# Patient Record
Sex: FEMALE | Race: WHITE | HISPANIC OR LATINO | Employment: UNEMPLOYED | URBAN - METROPOLITAN AREA
[De-identification: names, ages, dates, MRNs, and addresses within clinical notes are randomized per-mention and may not be internally consistent; named-entity substitution may affect disease eponyms.]

---

## 2023-09-26 ENCOUNTER — PATIENT MESSAGE (OUTPATIENT)
Dept: FAMILY MEDICINE | Facility: CLINIC | Age: 23
End: 2023-09-26

## 2023-09-26 ENCOUNTER — OFFICE VISIT (OUTPATIENT)
Dept: FAMILY MEDICINE | Facility: CLINIC | Age: 23
End: 2023-09-26
Payer: OTHER GOVERNMENT

## 2023-09-26 VITALS
RESPIRATION RATE: 16 BRPM | WEIGHT: 154 LBS | DIASTOLIC BLOOD PRESSURE: 80 MMHG | HEIGHT: 64 IN | BODY MASS INDEX: 26.29 KG/M2 | SYSTOLIC BLOOD PRESSURE: 108 MMHG

## 2023-09-26 DIAGNOSIS — Z31.69 PRE-CONCEPTION COUNSELING: ICD-10-CM

## 2023-09-26 DIAGNOSIS — F33.41 RECURRENT MAJOR DEPRESSIVE DISORDER, IN PARTIAL REMISSION: ICD-10-CM

## 2023-09-26 DIAGNOSIS — F17.200 VAPING NICOTINE DEPENDENCE, NON-TOBACCO PRODUCT: ICD-10-CM

## 2023-09-26 DIAGNOSIS — F90.9 ATTENTION DEFICIT HYPERACTIVITY DISORDER (ADHD), UNSPECIFIED ADHD TYPE: Primary | ICD-10-CM

## 2023-09-26 PROCEDURE — 99204 OFFICE O/P NEW MOD 45 MIN: CPT | Mod: S$GLB,,, | Performed by: STUDENT IN AN ORGANIZED HEALTH CARE EDUCATION/TRAINING PROGRAM

## 2023-09-26 PROCEDURE — 99204 PR OFFICE/OUTPT VISIT, NEW, LEVL IV, 45-59 MIN: ICD-10-PCS | Mod: S$GLB,,, | Performed by: STUDENT IN AN ORGANIZED HEALTH CARE EDUCATION/TRAINING PROGRAM

## 2023-09-26 RX ORDER — LISDEXAMFETAMINE DIMESYLATE 50 MG/1
50 CAPSULE ORAL EVERY MORNING
Qty: 30 CAPSULE | Refills: 0 | Status: SHIPPED | OUTPATIENT
Start: 2023-09-26 | End: 2023-10-27

## 2023-09-26 RX ORDER — LISDEXAMFETAMINE DIMESYLATE 50 MG/1
50 CAPSULE ORAL EVERY MORNING
COMMUNITY
End: 2023-09-26

## 2023-09-26 RX ORDER — BUPROPION HYDROCHLORIDE 150 MG/1
150 TABLET ORAL DAILY
Qty: 30 TABLET | Refills: 2 | Status: SHIPPED | OUTPATIENT
Start: 2023-09-26 | End: 2023-10-05

## 2023-09-26 RX ORDER — BUPROPION HYDROCHLORIDE 150 MG/1
150 TABLET ORAL DAILY
COMMUNITY
End: 2023-09-26

## 2023-09-26 RX ORDER — DM/P-EPHED/ACETAMINOPH/DOXYLAM 30-7.5/3
2 LIQUID (ML) ORAL
Qty: 90 LOZENGE | Refills: 1 | Status: SHIPPED | OUTPATIENT
Start: 2023-09-26 | End: 2023-10-05

## 2023-09-26 NOTE — PROGRESS NOTES
[facility]  Primary Care Clinic - [place], [place]    Family Medicine Office Visit    Subjective     Patient ID: Luiza Asif is a 23 y.o. female who presents to clinic today to establish care.     Medical history, surgical history, medications, allergies, and social history were reviewed and updated.     Chief Complaint: Establish Care (Patient states She'd like to establish care and be referred to a Psychiatrist. She is  insurance and will need a [facility] auth.)    HPI    Establish care  She presents today to establish care. She has just moved to the area from [place] with her  who is in the [employer]. We have reviewed medical history, surgical history, family history, medications, allergies, and social history. EMR was updated appropriately.     ADHD/Depression/Anxiety/Binge Eating  Letter for psychiatrist for ADHD/Anxiety/Depression/Binge eating will be uploaded through [facility]. From [place]; [facility]. Has been on Vyvanse 50 mg daily since March 2023. Working well. Taking it for ADHD/Binge eating. 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 No difficulty with appetite or weight loss. No difficulty with sleep.     For the wellbutrin, was missing a few doses. Did report some mild anxiety with taking it, but feels that she has not given herself long enough on it to know if it is working well. No suicidal/homicidal ideation. Has recently moved across the country and reports stress from this.     Vaping  She has a history of vaping for the last 5 years. She is interested in quitting. Has tried drinking from straws and chewing nicotine gum. She also reported that she may try using a vape pen without nicotine in the near future. We discussed that Wellbutrin can be helpful with cravings and would recommend she continue to use it to see if this will help her quit. Agreeable to try nicotine lozenges today.     Pre-conception counseling    She is sexually active with her . They use condoms sometimes. No other birth control in use. She reports not wanting to become pregnant for at least 3 years. Discussed birth control options, including pills, patches, rings, nexplanon, and IUD. She will consider her options. Discussed taking a prenatal vitamin as she is a female of child bearing age to prevent neural tube defects. Also discussed taking plan B if needed in the future. She did have an OBGYN in [place] and had pap smear completed prior to her move. Will obtain records.     Vitals:    09/26/23 1121   BP: 108/80   Resp: 16      Wt Readings from Last 3 Encounters:   09/26/23 1121 69.9 kg (154 lb)      Review of Systems    Review of Systems otherwise negative unless specified above.        Objective     Physical Exam  Constitutional:       General: She is not in acute distress.     Appearance: Normal appearance.   HENT:      Head: Normocephalic and atraumatic.      Mouth/Throat:      Mouth: Mucous membranes are moist.   Cardiovascular:      Rate and Rhythm: Normal rate and regular rhythm.      Heart sounds: No murmur heard.  Pulmonary:      Effort: Pulmonary effort is normal. No respiratory distress.      Breath sounds: Normal breath sounds. No wheezing.   Musculoskeletal:         General: Normal range of motion.   Skin:     General: Skin is warm and dry.   Neurological:      General: No focal deficit present.      Mental Status: She is alert and oriented to person, place, and time.            Assessment and Plan     Current Outpatient Medications   Medication Instructions    buPROPion (WELLBUTRIN XL) 150 mg, Oral, Daily    lisdexamfetamine (VYVANSE) 50 mg, Oral, Every morning    nicotine polacrilex 2 mg, Oral, As needed (PRN)        1. Attention deficit hyperactivity disorder (ADHD), unspecified ADHD type  -     lisdexamfetamine (VYVANSE) 50 MG capsule; Take 1 capsule (50 mg total) by mouth every morning.  Dispense: 30 capsule; Refill: 0    2.  Recurrent major depressive disorder, in partial remission  -     buPROPion (WELLBUTRIN XL) 150 MG TB24 tablet; Take 1 tablet (150 mg total) by mouth once daily.  Dispense: 30 tablet; Refill: 2    3. Vaping nicotine dependence, non-tobacco product  -     nicotine polacrilex 2 MG Lozg; Take 1 lozenge (2 mg total) by mouth as needed (When wanting to vape).  Dispense: 90 lozenge; Refill: 1    4. Pre-conception counseling  -     Cancel: External Referral to Robert Applebaum MD; Future; Expected date: 09/26/2023  -     External Referral to Robert Applebaum MD; Future; Expected date: 09/26/2023        Will continue current medication regimen with Vyvanse and Wellbutrin as above. Will obtain records from PCP, OBGYN, and Psych in [place]. Will also place referral to OBGYN per her request. She does have  through the [facility] and we will get prior authorization if needed. Will otherwise plan to have her return in 1 month once records are received.          Follow up in about 4 weeks (around 10/24/2023) for Follow up with Charles.    Questions were invited and answered. No other acute concerns at this time. Will plan to follow up as above or sooner if needed.     Fadia Soto, DO  09/26/2023 11:44 AM

## 2023-09-26 NOTE — PATIENT INSTRUCTIONS
Benjamin Jarrett,     If you are due for any health screening(s) below please notify me so we can arrange them to be ordered and scheduled. Most healthy patients at your age complete them, but you are free to accept or refuse.     If you can't do it, I'll definitely understand. If you can, I'd certainly appreciate it!    Tests to Keep You Healthy    Cervical Cancer Screening: DUE      Your cervical cancer screening is due     Our records indicate that you may be overdue for your screening Pap smear. A Pap smear is an important health screening that can detect abnormal cells that can become cervical cancer. Cervical cancer screenings allow for early diagnosis and increase the likelihood of successful treatment.     The current recommendation for Pap smear screening is every 3-5 years for women at average risk. We encourage you to schedule your appointment with your womens health provider. Many women see a gynecologist for this screening, but some primary care providers also provide Pap screening.     If you recently had your Pap smear screening performed outside of [facility], please let your health care team know so that they can update your health record.      Were here to help you quit smoking     Our records indicated that you are still smoking. One of the best things you can do for your health is to stop smoking and we are here to help.     Talk with your provider about our Smoking Cessation Program and how we can support you on your journey.

## 2023-09-28 ENCOUNTER — TELEPHONE (OUTPATIENT)
Dept: FAMILY MEDICINE | Facility: CLINIC | Age: 23
End: 2023-09-28
Payer: OTHER GOVERNMENT

## 2023-09-28 NOTE — TELEPHONE ENCOUNTER
Ciaran Augustin,   Please review this message below.  Call placed to pt due to msg left, spoke w/pt calling for an status on PA for Vyvanse medication. Informed pt I sent her earlier msg over to be reviewed by Charli Chavira MA and will send this update request right now.  Thank you.  Signed:  Mariah Pelayo LPN    ----- Message from Nancie Osuna sent at 9/28/2023  2:54 PM CDT -----  Regarding: Patient advice  Contact: Pt  Pt called in regards to getting medication sent to pharmacy     Pt can be reached at [phone]

## 2023-09-28 NOTE — TELEPHONE ENCOUNTER
Ciaran Augustin,   Please review message below from patient states needs a PA for the Rx Vynanse 50 mg.  Thank you.  Signed:  Mariah Pelayo LPN    ----- Message from Miles Hollins sent at 9/28/2023  8:18 AM CDT -----  Type:  Patient Returning Call    Who Called:pt  Who Left Message for Patient:  Does the patient know what this is regarding?:rx   Would the patient rather a call back or a response via [facility]? Call  Best Call Back Number:[phone]  Additional Information: pt would like to kow if the pre authorization were sent into [employer] for the prescription lisdexamfetamine (VYVANSE) 50 MG capsule. Because the pt is having troubles picking up from the pharmacy

## 2023-09-29 ENCOUNTER — PATIENT MESSAGE (OUTPATIENT)
Dept: FAMILY MEDICINE | Facility: CLINIC | Age: 23
End: 2023-09-29

## 2023-09-29 ENCOUNTER — OFFICE VISIT (OUTPATIENT)
Dept: FAMILY MEDICINE | Facility: CLINIC | Age: 23
End: 2023-09-29
Payer: OTHER GOVERNMENT

## 2023-09-29 VITALS
WEIGHT: 162 LBS | BODY MASS INDEX: 27.66 KG/M2 | TEMPERATURE: 98 F | DIASTOLIC BLOOD PRESSURE: 72 MMHG | OXYGEN SATURATION: 98 % | HEIGHT: 64 IN | SYSTOLIC BLOOD PRESSURE: 115 MMHG | HEART RATE: 74 BPM

## 2023-09-29 DIAGNOSIS — B37.31 YEAST VAGINITIS: ICD-10-CM

## 2023-09-29 DIAGNOSIS — N63.23 MASS OF LOWER OUTER QUADRANT OF LEFT BREAST: Primary | ICD-10-CM

## 2023-09-29 DIAGNOSIS — L03.818 CELLULITIS OF OTHER SPECIFIED SITE: ICD-10-CM

## 2023-09-29 PROCEDURE — 99213 OFFICE O/P EST LOW 20 MIN: CPT | Mod: S$GLB,,, | Performed by: STUDENT IN AN ORGANIZED HEALTH CARE EDUCATION/TRAINING PROGRAM

## 2023-09-29 PROCEDURE — 99213 PR OFFICE/OUTPT VISIT, EST, LEVL III, 20-29 MIN: ICD-10-PCS | Mod: S$GLB,,, | Performed by: STUDENT IN AN ORGANIZED HEALTH CARE EDUCATION/TRAINING PROGRAM

## 2023-09-29 RX ORDER — FLUCONAZOLE 150 MG/1
150 TABLET ORAL DAILY
Qty: 2 TABLET | Refills: 0 | Status: SHIPPED | OUTPATIENT
Start: 2023-09-29 | End: 2023-09-30

## 2023-09-29 RX ORDER — SULFAMETHOXAZOLE AND TRIMETHOPRIM 800; 160 MG/1; MG/1
1 TABLET ORAL 2 TIMES DAILY
Qty: 14 TABLET | Refills: 0 | Status: SHIPPED | OUTPATIENT
Start: 2023-09-29 | End: 2023-10-06

## 2023-09-29 NOTE — PATIENT INSTRUCTIONS
Benjamin Jarrett,     If you are due for any health screening(s) below please notify me so we can arrange them to be ordered and scheduled. Most healthy patients at your age complete them, but you are free to accept or refuse.     If you can't do it, I'll definitely understand. If you can, I'd certainly appreciate it!    Tests to Keep You Healthy    Cervical Cancer Screening: DUE      Your cervical cancer screening is due     Our records indicate that you may be overdue for your screening Pap smear. A Pap smear is an important health screening that can detect abnormal cells that can become cervical cancer. Cervical cancer screenings allow for early diagnosis and increase the likelihood of successful treatment.     The current recommendation for Pap smear screening is every 3-5 years for women at average risk. We encourage you to schedule your appointment with your [facility] health provider. Many women see a gynecologist for this screening, but some primary care providers also provide Pap screening.     If you recently had your Pap smear screening performed outside of [facility], please let your health care team know so that they can update your health record.      Were here to help you quit smoking     Our records indicated that you are still smoking. One of the best things you can do for your health is to stop smoking and we are here to help.     Talk with your provider about our Smoking Cessation Program and how we can support you on your journey.                Benjamin Jarrett,     If you are due for any health screening(s) below please notify me so we can arrange them to be ordered and scheduled. Most healthy patients at your age complete them, but you are free to accept or refuse.     If you can't do it, I'll definitely understand. If you can, I'd certainly appreciate it!    Tests to Keep You Healthy    Cervical Cancer Screening: DUE      Your cervical cancer screening is due     Our records indicate that you may  be overdue for your screening Pap smear. A Pap smear is an important health screening that can detect abnormal cells that can become cervical cancer. Cervical cancer screenings allow for early diagnosis and increase the likelihood of successful treatment.     The current recommendation for Pap smear screening is every 3-5 years for women at average risk. We encourage you to schedule your appointment with your [facility] provider. Many women see a gynecologist for this screening, but some primary care providers also provide Pap screening.     If you recently had your Pap smear screening performed outside of [facility], please let your health care team know so that they can update your health record.      Were here to help you quit smoking     Our records indicated that you are still smoking. One of the best things you can do for your health is to stop smoking and we are here to help.     Talk with your provider about our Smoking Cessation Program and how we can support you on your journey.

## 2023-09-29 NOTE — PROGRESS NOTES
[facility]  Primary Care Clinic - [place], [place]    Family Medicine Office Visit    Subjective     Patient ID: Luiza Asif is a 23 y.o. female who presents to clinic today for an acute visit.     Medical history, surgical history, medications, allergies, and social history were reviewed and updated.     Chief Complaint: Breast Pain (Left breast. Red and tender to the touch.)    HPI    Left breat lump  Noticed it 5 days ago and believed it was a pimple. Did try to squeeze it and see if something would come out. Reported a small amount came out. Has become more swollen, red, and irritated. No drainage since. Has not had anything like this before. No other skin changes to breat or nipple discharge. No bleeding. No family history of breast cancer. No fevers/chills. Not taking any medications for it.     Vitals:    09/29/23 1306   BP: 115/72   Pulse: 74   Temp: 98.3 °F (36.8 °C)      Wt Readings from Last 3 Encounters:   09/29/23 1306 73.5 kg (162 lb)   09/26/23 1121 69.9 kg (154 lb)      Review of Systems    Review of Systems otherwise negative unless specified above.        Objective     Physical Exam  Exam conducted with a chaperone present.   Constitutional:       General: She is not in acute distress.     Appearance: Normal appearance.   HENT:      Head: Normocephalic and atraumatic.      Mouth/Throat:      Mouth: Mucous membranes are moist.   Cardiovascular:      Rate and Rhythm: Normal rate and regular rhythm.      Heart sounds: No murmur heard.  Pulmonary:      Effort: Pulmonary effort is normal. No respiratory distress.      Breath sounds: Normal breath sounds. No wheezing.   Chest:          Comments: 2 cm erythematous lesion on left lower outer quadrant; no fluctuance; mass is firm  Musculoskeletal:         General: Normal range of motion.   Skin:     General: Skin is warm.      Findings: Erythema present.   Neurological:      General: No focal deficit present.      Mental Status: She is  [place] Visual Field 36 point screen: I have reviewed the visual fields both taped and untaped done by Dr. Celi Pavon on this patient which demonstrate significant obstruction of the patient's peripheral visual field on both eyes. alert and oriented to person, place, and time.            Assessment and Plan     Current Outpatient Medications   Medication Instructions    buPROPion (WELLBUTRIN XL) 150 mg, Oral, Daily    fluconazole (DIFLUCAN) 150 mg, Oral, Daily    lisdexamfetamine (VYVANSE) 50 mg, Oral, Every morning    nicotine polacrilex 2 mg, Oral, As needed (PRN)    sulfamethoxazole-trimethoprim 800-160mg (BACTRIM DS) 800-160 mg Tab 1 tablet, Oral, 2 times daily        1. Mass of lower outer quadrant of left breast  -     US Breast Left Limited; Future; Expected date: 09/29/2023    2. Cellulitis of other specified site  -     sulfamethoxazole-trimethoprim 800-160mg (BACTRIM DS) 800-160 mg Tab; Take 1 tablet by mouth 2 (two) times daily. for 7 days  Dispense: 14 tablet; Refill: 0    3. Yeast vaginitis  -     fluconazole (DIFLUCAN) 150 MG Tab; Take 1 tablet (150 mg total) by mouth once daily. for 1 day  Dispense: 2 tablet; Refill: 0        Believe that this is cellulitis versus an early abscess.  Mass is not fluctuant, so unable to drain clinic today.  We will plan to administer oral antibiotic.  We will administer Bactrim twice daily for 7 days.  She reported that she frequently gets UTIs with antibiotic use, so we will provide course of Diflucan as well.  We will schedule her for ultrasound as well for confirmation that this is not something new or suspicious.  Recommended that she take Tylenol and Motrin alternating over the weekend.  Discussed that should she develop worsening symptoms, including fever, chills, increase in size of mass, nausea, vomiting, or drainage, she should present to urgent care or the ER.    Of note, she did mention that a prior authorization was requested for Vyvanse.  We will try to complete that today.  We are still awaiting records from her psychiatrist in [place].         Follow up if symptoms worsen or fail to improve.    Questions were invited and answered. No other acute concerns at this time. Will plan  to follow up as above or sooner if needed.     Fadia Soto DO  09/29/2023 1:08 PM

## 2023-10-04 ENCOUNTER — HOSPITAL ENCOUNTER (OUTPATIENT)
Dept: RADIOLOGY | Facility: HOSPITAL | Age: 23
Discharge: HOME | End: 2023-10-04
Attending: STUDENT IN AN ORGANIZED HEALTH CARE EDUCATION/TRAINING PROGRAM
Payer: OTHER GOVERNMENT

## 2023-10-04 DIAGNOSIS — N63.23 MASS OF LOWER OUTER QUADRANT OF LEFT BREAST: ICD-10-CM

## 2023-10-04 PROCEDURE — 76642 ULTRASOUND BREAST LIMITED: CPT | Mod: 26,LT,, | Performed by: RADIOLOGY

## 2023-10-04 PROCEDURE — 76642 US BREAST LEFT LIMITED: ICD-10-PCS | Mod: 26,LT,, | Performed by: RADIOLOGY

## 2023-10-04 PROCEDURE — 76642 ULTRASOUND BREAST LIMITED: CPT | Mod: TC,LT

## 2023-10-05 ENCOUNTER — OFFICE VISIT (OUTPATIENT)
Dept: OBSTETRICS AND GYNECOLOGY | Facility: CLINIC | Age: 23
End: 2023-10-05
Payer: OTHER GOVERNMENT

## 2023-10-05 VITALS
SYSTOLIC BLOOD PRESSURE: 124 MMHG | HEIGHT: 65 IN | BODY MASS INDEX: 25.93 KG/M2 | DIASTOLIC BLOOD PRESSURE: 70 MMHG | WEIGHT: 155.63 LBS

## 2023-10-05 DIAGNOSIS — Z12.4 PAP SMEAR FOR CERVICAL CANCER SCREENING: ICD-10-CM

## 2023-10-05 DIAGNOSIS — N93.0 POSTCOITAL BLEEDING: ICD-10-CM

## 2023-10-05 DIAGNOSIS — Z01.419 ENCOUNTER FOR ANNUAL ROUTINE GYNECOLOGICAL EXAMINATION: Primary | ICD-10-CM

## 2023-10-05 PROCEDURE — 87491 CHLMYD TRACH DNA AMP PROBE: CPT | Performed by: OBSTETRICS & GYNECOLOGY

## 2023-10-05 PROCEDURE — 87591 N.GONORRHOEAE DNA AMP PROB: CPT | Performed by: OBSTETRICS & GYNECOLOGY

## 2023-10-05 PROCEDURE — 88175 CYTOPATH C/V AUTO FLUID REDO: CPT | Performed by: STUDENT IN AN ORGANIZED HEALTH CARE EDUCATION/TRAINING PROGRAM

## 2023-10-05 PROCEDURE — 99385 PREV VISIT NEW AGE 18-39: CPT | Mod: S$GLB,,, | Performed by: OBSTETRICS & GYNECOLOGY

## 2023-10-05 PROCEDURE — 88141 PR  CYTOPATH CERV/VAG INTERPRET: ICD-10-PCS | Mod: ,,, | Performed by: STUDENT IN AN ORGANIZED HEALTH CARE EDUCATION/TRAINING PROGRAM

## 2023-10-05 PROCEDURE — 88141 CYTOPATH C/V INTERPRET: CPT | Mod: ,,, | Performed by: STUDENT IN AN ORGANIZED HEALTH CARE EDUCATION/TRAINING PROGRAM

## 2023-10-05 PROCEDURE — 99385 PR PREVENTIVE VISIT,NEW,18-39: ICD-10-PCS | Mod: S$GLB,,, | Performed by: OBSTETRICS & GYNECOLOGY

## 2023-10-05 NOTE — PROGRESS NOTES
"Subjective:       Luiza Asif is a 23 y.o. female here for a routine exam.  Current complaints:  Some bleeding after intercourse recently.  Personal health questionnaire reviewed: yes.     Gynecologic History  Patient's last menstrual period was 2023 (exact date).  Contraception: none  Last Pap:  2 years ago. Results were: normal  Last mammogram: none. Results were: none    Obstetric History  OB History    Para Term  AB Living   0 0 0 0 0 0   SAB IAB Ectopic Multiple Live Births   0 0 0 0 0         The following portions of the patient's history were reviewed and updated as appropriate: allergies, current medications, past family history, past medical history, past social history, past surgical history, and problem list.    Review of Systems    ROS: (except as stated in HPI)  GENERAL: Denies weight gain or weight loss. Feeling well overall.   SKIN: Denies rash or lesions.   HEAD: Denies head injury or headache.   NODES: Denies enlarged lymph nodes.   CHEST: Denies chest pain or shortness of breath.   CARDIOVASCULAR: Denies palpitations or left sided chest pain.   ABDOMEN: No abdominal pain, constipation, diarrhea, nausea, vomiting or rectal bleeding.   URINARY: No frequency, dysuria, hematuria, or burning on urination.  REPRODUCTIVE: See HPI.   BREASTS: The patient performs breast self-examination and denies pain, lumps, or nipple discharge.   HEMATOLOGIC: No easy bruisability or excessive bleeding.   MUSCULOSKELETAL: Denies joint pain or swelling.   NEUROLOGIC: Denies syncope or weakness.   PSYCHIATRIC: Denies depression, anxiety or mood swings.           Objective:        /70   Ht 5' 4.5" (1.638 m)   Wt 70.6 kg (155 lb 9.6 oz)   LMP 2023 (Exact Date)   BMI 26.30 kg/m²     General Appearance:    Alert, cooperative, no distress, appears stated age   Head:    Normocephalic, without obvious abnormality, atraumatic   Eyes:    PERRL, conjunctiva/corneas clear, EOM's intact, " fundi     benign, both eyes   Ears:    Normal TM's and external ear canals, both ears   Nose:   Nares normal, septum midline, mucosa normal, no drainage    or sinus tenderness   Throat:   Lips, mucosa, and tongue normal; teeth and gums normal   Neck:   Supple, symmetrical, trachea midline, no adenopathy;     thyroid:  no enlargement/tenderness/nodules; no carotid    bruit or JVD   Back:     Symmetric, no curvature, ROM normal, no CVA tenderness   Lungs:     Clear to auscultation bilaterally, respirations unlabored   Chest Wall:    No tenderness or deformity    Heart:    Regular rate and rhythm, S1 and S2 normal, no murmur, rub   or gallop   Breast Exam:    No tenderness, masses, or nipple abnormality; resolving small left breast abscess   Abdomen:     Soft, non-tender, bowel sounds active all four quadrants,     no masses, no organomegaly   Genitalia:    Normal female without lesion, discharge or tenderness      Pelvic  Vv normal mucosa, without discharge or lesions  Cervix clear  Uterus anteverted normal size  Adnexa normal, without masses     Rectal:     Not done   Extremities:   Extremities normal, atraumatic, no cyanosis or edema   Pulses:   2+ and symmetric all extremities   Skin:   Skin color, texture, turgor normal, no rashes or lesions   Lymph nodes:   Cervical, supraclavicular, and axillary nodes normal   Neurologic:   CNII-XII intact, normal strength, sensation and reflexes     throughout         Assessment:      Healthy female exam.      Plan:      Postcoital bleeding, Pap and GC and CT cultures done  No contraception desired  Return 1 year or as needed

## 2023-10-07 LAB
C TRACH DNA SPEC QL NAA+PROBE: NOT DETECTED
N GONORRHOEA DNA SPEC QL NAA+PROBE: NOT DETECTED

## 2023-10-12 LAB
FINAL PATHOLOGIC DIAGNOSIS: NORMAL
Lab: NORMAL

## 2023-10-26 ENCOUNTER — OFFICE VISIT (OUTPATIENT)
Dept: FAMILY MEDICINE | Facility: CLINIC | Age: 23
End: 2023-10-26
Payer: OTHER GOVERNMENT

## 2023-10-26 VITALS
SYSTOLIC BLOOD PRESSURE: 108 MMHG | BODY MASS INDEX: 26.29 KG/M2 | TEMPERATURE: 98 F | DIASTOLIC BLOOD PRESSURE: 70 MMHG | HEIGHT: 64 IN | HEART RATE: 71 BPM | OXYGEN SATURATION: 98 % | WEIGHT: 154 LBS

## 2023-10-26 DIAGNOSIS — Z79.899 HIGH RISK MEDICATION USE: ICD-10-CM

## 2023-10-26 DIAGNOSIS — Z13.220 SCREENING FOR LIPID DISORDERS: ICD-10-CM

## 2023-10-26 DIAGNOSIS — F90.9 ATTENTION DEFICIT HYPERACTIVITY DISORDER (ADHD), UNSPECIFIED ADHD TYPE: Primary | ICD-10-CM

## 2023-10-26 DIAGNOSIS — Z11.4 SCREENING FOR HIV (HUMAN IMMUNODEFICIENCY VIRUS): ICD-10-CM

## 2023-10-26 DIAGNOSIS — F41.9 ANXIETY: ICD-10-CM

## 2023-10-26 DIAGNOSIS — F17.200 VAPING NICOTINE DEPENDENCE, NON-TOBACCO PRODUCT: ICD-10-CM

## 2023-10-26 DIAGNOSIS — Z11.59 NEED FOR HEPATITIS C SCREENING TEST: ICD-10-CM

## 2023-10-26 DIAGNOSIS — F33.41 RECURRENT MAJOR DEPRESSIVE DISORDER, IN PARTIAL REMISSION: ICD-10-CM

## 2023-10-26 LAB
AMPHET+METHAMPHET UR QL: ABNORMAL
BARBITURATES UR QL SCN>200 NG/ML: NEGATIVE
BENZODIAZ UR QL SCN>200 NG/ML: NEGATIVE
BZE UR QL SCN: NEGATIVE
CANNABINOIDS UR QL SCN: NEGATIVE
CREAT UR-MCNC: 40 MG/DL (ref 15–325)
METHADONE UR QL SCN>300 NG/ML: NEGATIVE
OPIATES UR QL SCN: NEGATIVE
PCP UR QL SCN>25 NG/ML: NEGATIVE
TOXICOLOGY INFORMATION: ABNORMAL

## 2023-10-26 PROCEDURE — 80307 DRUG TEST PRSMV CHEM ANLYZR: CPT | Performed by: STUDENT IN AN ORGANIZED HEALTH CARE EDUCATION/TRAINING PROGRAM

## 2023-10-26 PROCEDURE — 99214 PR OFFICE/OUTPT VISIT, EST, LEVL IV, 30-39 MIN: ICD-10-PCS | Mod: S$GLB,,, | Performed by: STUDENT IN AN ORGANIZED HEALTH CARE EDUCATION/TRAINING PROGRAM

## 2023-10-26 PROCEDURE — 99214 OFFICE O/P EST MOD 30 MIN: CPT | Mod: S$GLB,,, | Performed by: STUDENT IN AN ORGANIZED HEALTH CARE EDUCATION/TRAINING PROGRAM

## 2023-10-26 RX ORDER — BUPROPION HYDROCHLORIDE 150 MG/1
TABLET ORAL
COMMUNITY
Start: 2023-09-26 | End: 2023-10-26 | Stop reason: SINTOL

## 2023-10-26 RX ORDER — DM/P-EPHED/ACETAMINOPH/DOXYLAM 30-7.5/3
2 LIQUID (ML) ORAL
Qty: 90 LOZENGE | Refills: 1 | Status: SHIPPED | OUTPATIENT
Start: 2023-10-26

## 2023-10-26 NOTE — PATIENT INSTRUCTIONS
Benjamin Jarrett,     If you are due for any health screening(s) below please notify me so we can arrange them to be ordered and scheduled. Most healthy patients at your age complete them, but you are free to accept or refuse.     If you can't do it, I'll definitely understand. If you can, I'd certainly appreciate it!    All of your core healthy metrics are met.      Were here to help you quit smoking     Our records indicated that you are still smoking. One of the best things you can do for your health is to stop smoking and we are here to help.     Talk with your provider about our Smoking Cessation Program and how we can support you on your journey.

## 2023-10-26 NOTE — PROGRESS NOTES
[facility]  Primary Care Clinic - [place], [place]    Family Medicine Office Visit    Subjective     Patient ID: Luiza Asif is a 23 y.o. female who presents to clinic today to follow up.     Medical history, surgical history, medications, allergies, and social history were reviewed and updated.     Chief Complaint: Follow-up    Follow-up        ADHD  She has a history of ADHD that has been confirmed with testing completed in [place].  Official testing is available in media manager that was uploaded on 10/10/2023.  She will complete controlled substance agreement today as well as urine drug screen.  Symptoms noted well-controlled on Vyvanse 50 mg daily.  She reported no issues with sleep or appetite.  She would like to continue on her current medication regimen.    Depression/Anxiety  She reported that she was previously on Wellbutrin 150 mg daily.  Reported that this medication made her feel like she was being suppressed.  She reported that she has discontinued medication and no longer wants to take it.  Feels that her symptoms are well-controlled at this time and would like to just continue with the Vyvanse alone.  Discussed that should she develop worsening symptoms we can discuss further medications.  She did not report suicidal or homicidal ideation at this time.    Vaping  She has a history of vaping and we would previously prescribed nicotine lozenges.  She requested that these be sent into her pharmacy again as she is hopeful that they will have him now.  She is hopeful to be able to stop vaping with nicotine replacement.      Vitals:    10/26/23 1411   BP: 108/70   Pulse: 71   Temp: 98.3 °F (36.8 °C)      Wt Readings from Last 3 Encounters:   10/26/23 1411 69.9 kg (154 lb)   10/05/23 1122 70.6 kg (155 lb 9.6 oz)   09/29/23 1306 73.5 kg (162 lb)      Review of Systems    Review of Systems otherwise negative unless specified above.        Objective     Physical Exam  Constitutional:        General: She is not in acute distress.     Appearance: Normal appearance.   HENT:      Head: Normocephalic and atraumatic.      Mouth/Throat:      Mouth: Mucous membranes are moist.   Cardiovascular:      Rate and Rhythm: Normal rate and regular rhythm.      Heart sounds: No murmur heard.  Pulmonary:      Effort: Pulmonary effort is normal. No respiratory distress.      Breath sounds: Normal breath sounds. No wheezing.   Musculoskeletal:         General: Normal range of motion.   Skin:     General: Skin is warm and dry.   Neurological:      General: No focal deficit present.      Mental Status: She is alert and oriented to person, place, and time.   Psychiatric:         Mood and Affect: Mood normal.         Behavior: Behavior normal.            Assessment and Plan     Current Outpatient Medications   Medication Instructions    lisdexamfetamine (VYVANSE) 50 mg, Oral, Every morning    nicotine polacrilex 2 mg, Oral, As needed (PRN)        1. Attention deficit hyperactivity disorder (ADHD), unspecified ADHD type  -     Drug screen panel, in-house; Future; Expected date: 10/26/2023    2. Anxiety    3. Recurrent major depressive disorder, in partial remission    4. Vaping nicotine dependence, non-tobacco product  -     nicotine polacrilex 2 MG Lozg; Take 1 lozenge (2 mg total) by mouth as needed (When wanting to vape).  Dispense: 90 lozenge; Refill: 1    5. High risk medication use  -     Renal Function Panel; Future; Expected date: 11/02/2023    6. Screening for lipid disorders  -     Lipid Panel; Future; Expected date: 11/02/2023    7. Need for hepatitis C screening test  -     Hepatitis C antibody; Future; Expected date: 10/27/2023    8. Screening for HIV (human immunodeficiency virus)  -     HIV 1/2 Ag/Ab (4th Gen); Future; Expected date: 11/02/2023        We will obtain urine drug screening complete controlled substance agreement today.  She would urine drug screen returned normal, we will plan to send in [employer]  chamber Vyvanse 50 mg daily.  We will recent in nicotine lozenges.  We will also plan to complete screening labs next week.  We will otherwise plan to have her follow up in 3 months or sooner if needed.         Follow up in about 3 months (around 1/26/2024) for Follow up with Charles.    Questions were invited and answered. No other acute concerns at this time. Will plan to follow up as above or sooner if needed.     Fadia Soto, DO  10/26/2023 2:30 PM

## 2023-10-27 RX ORDER — LISDEXAMFETAMINE DIMESYLATE 50 MG/1
50 CAPSULE ORAL EVERY MORNING
Qty: 30 CAPSULE | Refills: 0 | Status: SHIPPED | OUTPATIENT
Start: 2023-11-26 | End: 2023-12-26

## 2023-10-27 RX ORDER — LISDEXAMFETAMINE DIMESYLATE 50 MG/1
50 CAPSULE ORAL EVERY MORNING
Qty: 30 CAPSULE | Refills: 0 | Status: SHIPPED | OUTPATIENT
Start: 2023-10-27 | End: 2023-11-26

## 2023-10-27 RX ORDER — LISDEXAMFETAMINE DIMESYLATE 50 MG/1
50 CAPSULE ORAL EVERY MORNING
Qty: 30 CAPSULE | Refills: 0 | Status: SHIPPED | OUTPATIENT
Start: 2023-12-26 | End: 2024-01-02

## 2023-11-03 ENCOUNTER — TELEPHONE (OUTPATIENT)
Dept: FAMILY MEDICINE | Facility: CLINIC | Age: 23
End: 2023-11-03
Payer: OTHER GOVERNMENT

## 2023-11-03 ENCOUNTER — LAB VISIT (OUTPATIENT)
Dept: LAB | Facility: CLINIC | Age: 23
End: 2023-11-03
Payer: OTHER GOVERNMENT

## 2023-11-03 DIAGNOSIS — Z13.220 SCREENING FOR LIPID DISORDERS: ICD-10-CM

## 2023-11-03 DIAGNOSIS — Z11.59 NEED FOR HEPATITIS C SCREENING TEST: ICD-10-CM

## 2023-11-03 DIAGNOSIS — Z79.899 HIGH RISK MEDICATION USE: ICD-10-CM

## 2023-11-03 DIAGNOSIS — Z11.4 SCREENING FOR HIV (HUMAN IMMUNODEFICIENCY VIRUS): ICD-10-CM

## 2023-11-03 LAB
ALBUMIN SERPL BCP-MCNC: 4.1 G/DL (ref 3.5–5.2)
ANION GAP SERPL CALC-SCNC: 10 MMOL/L (ref 8–16)
BUN SERPL-MCNC: 9 MG/DL (ref 6–20)
CALCIUM SERPL-MCNC: 9.7 MG/DL (ref 8.7–10.5)
CHLORIDE SERPL-SCNC: 104 MMOL/L (ref 95–110)
CHOLEST SERPL-MCNC: 215 MG/DL (ref 120–199)
CHOLEST/HDLC SERPL: 3.5 {RATIO} (ref 2–5)
CO2 SERPL-SCNC: 24 MMOL/L (ref 23–29)
CREAT SERPL-MCNC: 0.8 MG/DL (ref 0.5–1.4)
EST. GFR  (NO RACE VARIABLE): >60 ML/MIN/1.73 M^2
GLUCOSE SERPL-MCNC: 91 MG/DL (ref 70–110)
HCV AB SERPL QL IA: NORMAL
HDLC SERPL-MCNC: 61 MG/DL (ref 40–75)
HDLC SERPL: 28.4 % (ref 20–50)
HIV 1+2 AB+HIV1 P24 AG SERPL QL IA: NORMAL
LDLC SERPL CALC-MCNC: 137.4 MG/DL (ref 63–159)
NONHDLC SERPL-MCNC: 154 MG/DL
PHOSPHATE SERPL-MCNC: 3 MG/DL (ref 2.7–4.5)
POTASSIUM SERPL-SCNC: 3.7 MMOL/L (ref 3.5–5.1)
SODIUM SERPL-SCNC: 138 MMOL/L (ref 136–145)
TRIGL SERPL-MCNC: 83 MG/DL (ref 30–150)

## 2023-11-03 PROCEDURE — 86803 HEPATITIS C AB TEST: CPT | Performed by: STUDENT IN AN ORGANIZED HEALTH CARE EDUCATION/TRAINING PROGRAM

## 2023-11-03 PROCEDURE — 36415 PR COLLECTION VENOUS BLOOD,VENIPUNCTURE: ICD-10-PCS | Mod: ,,, | Performed by: STUDENT IN AN ORGANIZED HEALTH CARE EDUCATION/TRAINING PROGRAM

## 2023-11-03 PROCEDURE — 80061 LIPID PANEL: CPT | Performed by: STUDENT IN AN ORGANIZED HEALTH CARE EDUCATION/TRAINING PROGRAM

## 2023-11-03 PROCEDURE — 36415 COLL VENOUS BLD VENIPUNCTURE: CPT | Mod: ,,, | Performed by: STUDENT IN AN ORGANIZED HEALTH CARE EDUCATION/TRAINING PROGRAM

## 2023-11-03 PROCEDURE — 87389 HIV-1 AG W/HIV-1&-2 AB AG IA: CPT | Performed by: STUDENT IN AN ORGANIZED HEALTH CARE EDUCATION/TRAINING PROGRAM

## 2023-11-03 PROCEDURE — 80069 RENAL FUNCTION PANEL: CPT | Performed by: STUDENT IN AN ORGANIZED HEALTH CARE EDUCATION/TRAINING PROGRAM

## 2023-11-03 NOTE — TELEPHONE ENCOUNTER
Call placed to pt due to lab results and 's orders. Pt currently not available msg left for return call.  ----- Message from Fadia Soto DO sent at 11/3/2023  2:27 PM CDT -----  Please contact the patient and let her know that her lab results are available.  Renal function panel showed no electrolyte or kidney dysfunction.  Lipid panel showed mildly elevated total cholesterol but was otherwise without acute finding.  She can improve this with dietary changes, including increasing her lean protein intake (chicken and fish) and increasing vegetable intake.

## 2023-11-06 ENCOUNTER — PATIENT MESSAGE (OUTPATIENT)
Dept: FAMILY MEDICINE | Facility: CLINIC | Age: 23
End: 2023-11-06
Payer: OTHER GOVERNMENT

## 2023-12-31 ENCOUNTER — PATIENT MESSAGE (OUTPATIENT)
Dept: FAMILY MEDICINE | Facility: CLINIC | Age: 23
End: 2023-12-31
Payer: OTHER GOVERNMENT

## 2024-01-02 ENCOUNTER — OFFICE VISIT (OUTPATIENT)
Dept: FAMILY MEDICINE | Facility: CLINIC | Age: 24
End: 2024-01-02
Payer: OTHER GOVERNMENT

## 2024-01-02 VITALS
HEIGHT: 64 IN | SYSTOLIC BLOOD PRESSURE: 110 MMHG | TEMPERATURE: 99 F | OXYGEN SATURATION: 98 % | DIASTOLIC BLOOD PRESSURE: 70 MMHG | BODY MASS INDEX: 27.71 KG/M2 | HEART RATE: 88 BPM | WEIGHT: 162.31 LBS

## 2024-01-02 DIAGNOSIS — N30.00 ACUTE CYSTITIS WITHOUT HEMATURIA: ICD-10-CM

## 2024-01-02 DIAGNOSIS — F90.9 ATTENTION DEFICIT HYPERACTIVITY DISORDER (ADHD), UNSPECIFIED ADHD TYPE: ICD-10-CM

## 2024-01-02 DIAGNOSIS — B37.9 YEAST INFECTION: ICD-10-CM

## 2024-01-02 DIAGNOSIS — R30.0 DYSURIA: Primary | ICD-10-CM

## 2024-01-02 LAB
BILIRUBIN, UA POC OHS: NEGATIVE
BLOOD, UA POC OHS: NEGATIVE
CLARITY, UA POC OHS: CLEAR
COLOR, UA POC OHS: YELLOW
GLUCOSE, UA POC OHS: NEGATIVE
KETONES, UA POC OHS: NEGATIVE
LEUKOCYTES, UA POC OHS: ABNORMAL
NITRITE, UA POC OHS: NEGATIVE
PH, UA POC OHS: 5.5
PROTEIN, UA POC OHS: NEGATIVE
SPECIFIC GRAVITY, UA POC OHS: >=1.03
UROBILINOGEN, UA POC OHS: 0.2

## 2024-01-02 PROCEDURE — 99214 OFFICE O/P EST MOD 30 MIN: CPT | Mod: S$GLB,,, | Performed by: STUDENT IN AN ORGANIZED HEALTH CARE EDUCATION/TRAINING PROGRAM

## 2024-01-02 PROCEDURE — 87086 URINE CULTURE/COLONY COUNT: CPT | Performed by: STUDENT IN AN ORGANIZED HEALTH CARE EDUCATION/TRAINING PROGRAM

## 2024-01-02 PROCEDURE — 81003 URINALYSIS AUTO W/O SCOPE: CPT | Mod: QW,S$GLB,, | Performed by: STUDENT IN AN ORGANIZED HEALTH CARE EDUCATION/TRAINING PROGRAM

## 2024-01-02 RX ORDER — FLUCONAZOLE 150 MG/1
150 TABLET ORAL DAILY
Qty: 1 TABLET | Refills: 0 | Status: SHIPPED | OUTPATIENT
Start: 2024-01-02 | End: 2024-01-03

## 2024-01-02 RX ORDER — LISDEXAMFETAMINE DIMESYLATE CAPSULES 50 MG/1
50 CAPSULE ORAL EVERY MORNING
Qty: 30 CAPSULE | Refills: 0 | Status: SHIPPED | OUTPATIENT
Start: 2024-01-02 | End: 2024-01-27 | Stop reason: SDUPTHER

## 2024-01-02 RX ORDER — LISDEXAMFETAMINE DIMESYLATE CAPSULES 50 MG/1
50 CAPSULE ORAL EVERY MORNING
Qty: 30 CAPSULE | Refills: 0 | Status: CANCELLED | OUTPATIENT
Start: 2024-01-02

## 2024-01-02 RX ORDER — NITROFURANTOIN 25; 75 MG/1; MG/1
100 CAPSULE ORAL 2 TIMES DAILY
Qty: 10 CAPSULE | Refills: 0 | Status: SHIPPED | OUTPATIENT
Start: 2024-01-02 | End: 2024-01-07

## 2024-01-02 NOTE — PROGRESS NOTES
[facility]  Primary Care Clinic - [place], [place]    Family Medicine Office Visit    Subjective     Patient ID: Luiza Asif is a 23 y.o. female who presents to clinic today for an acute visit.     Medical history, surgical history, medications, allergies, and social history were reviewed and updated.     Chief Complaint: Urinary Tract Infection    HPI    UTI  Sunday night had pelvic pain. Felt pressure. Now having back pain. Tried drinking more water and took a dose of Azo. Picked up D-mannose yesterday and this morning.  No fevers or chills.  No hematuria.    ADHD   She reports a history of ADHD with current medication regimen of Vyvanse 50 mg daily.  Reports that his medication largely works well but around 3:00 p.m. every day she begins to feel that her symptoms are returning.  She was asking if there was a way she could take an additional stimulant in the afternoons to improve her symptoms.  Discussed that Vyvanse is a long-acting medication.  Discussed that we could increase her dose but she is near the maximum dose of 70 mg.  She reported that she was told by her psychiatrist in [place] that there was potential to be on multiple stimulants.  We discussed that we could change her dose to 60 mg but I would recommend referral to Psychiatry if she feels that this has still not improve her symptoms.  We discussed that at 60 mg her insurance require prior authorization.  She then elected to continue on the Vyvanse 50 mg for now and try taking it later in the day to see if she has improve symptoms.  Otherwise no difficulties with appetite or sleep.  PDMP reviewed and is appropriate.    Of note, she was having difficulty obtaining her prescriptions from her selected pharmacy.  Upon contacting the pharmacy they were trying to run her prescriptions through the wrong last name.  Prescription is available for .  Last  of Vyvanse was on 11/28/2023.    Vitals:    01/02/24 1015   BP:  110/70   Pulse: 88   Temp: 99 °F (37.2 °C)      Wt Readings from Last 3 Encounters:   01/02/24 1015 73.6 kg (162 lb 4.8 oz)   10/26/23 1411 69.9 kg (154 lb)   10/05/23 1122 70.6 kg (155 lb 9.6 oz)      Review of Systems    Review of Systems otherwise negative unless specified above.        Objective     Physical Exam  Constitutional:       General: She is not in acute distress.     Appearance: Normal appearance.   HENT:      Head: Normocephalic and atraumatic.      Mouth/Throat:      Mouth: Mucous membranes are moist.   Cardiovascular:      Rate and Rhythm: Normal rate and regular rhythm.      Heart sounds: No murmur heard.  Pulmonary:      Effort: Pulmonary effort is normal. No respiratory distress.      Breath sounds: Normal breath sounds. No wheezing.   Musculoskeletal:         General: Normal range of motion.   Skin:     General: Skin is warm and dry.   Neurological:      General: No focal deficit present.      Mental Status: She is alert and oriented to person, place, and time.            Assessment and Plan     Current Outpatient Medications   Medication Instructions    fluconazole (DIFLUCAN) 150 mg, Oral, Daily    lisdexamfetamine (VYVANSE) 50 mg, Oral, Every morning    nicotine polacrilex 2 mg, Oral, As needed (PRN)    nitrofurantoin, macrocrystal-monohydrate, (MACROBID) 100 MG capsule 100 mg, Oral, 2 times daily        1. Dysuria  -     POCT Urinalysis(Instrument)  -     Urine culture    2. Acute cystitis without hematuria  -     nitrofurantoin, macrocrystal-monohydrate, (MACROBID) 100 MG capsule; Take 1 capsule (100 mg total) by mouth 2 (two) times daily. for 5 days  Dispense: 10 capsule; Refill: 0    3. Attention deficit hyperactivity disorder (ADHD), unspecified ADHD type  -     lisdexamfetamine (VYVANSE) 50 MG capsule; Take 1 capsule (50 mg total) by mouth every morning.  Dispense: 30 capsule; Refill: 0  -     Ambulatory referral/consult to Psychiatry; Future; Expected date: 01/09/2024    4. Yeast  infection  -     fluconazole (DIFLUCAN) 150 MG Tab; Take 1 tablet (150 mg total) by mouth once daily. for 1 day  Dispense: 1 tablet; Refill: 0        Point of care urinalysis was negative for blood and nitrites.  There was small leukocyte esterase.  We will plan to treat with Macrobid for 5 days and obtain urine culture.  We will provide dose of Diflucan as she reports getting yeast infections while on antibiotics.  For her Vyvanse, we will send in a prescription as they were having difficulty locating her prior prescription that I sent in on October 26.  We will also place referral to Psychiatry should she continued to have difficulty on her current dose of Vyvanse.  We will otherwise plan to keep follow up in 4 weeks to determine if she would like to adjust her Vyvanse dose or continue on the current regimen.         Follow up for Keep follow up in 4 weeks.    Questions were invited and answered. No other acute concerns at this time. Will plan to follow up as above or sooner if needed.     Fadia Soto DO  01/02/2024 10:35 AM

## 2024-01-03 LAB — BACTERIA UR CULT: NORMAL

## 2024-01-08 ENCOUNTER — PATIENT MESSAGE (OUTPATIENT)
Dept: FAMILY MEDICINE | Facility: CLINIC | Age: 24
End: 2024-01-08
Payer: OTHER GOVERNMENT

## 2024-01-09 ENCOUNTER — TELEPHONE (OUTPATIENT)
Dept: FAMILY MEDICINE | Facility: CLINIC | Age: 24
End: 2024-01-09
Payer: OTHER GOVERNMENT

## 2024-01-09 NOTE — TELEPHONE ENCOUNTER
----- Message from Henrry Obando sent at 1/9/2024 10:34 AM CST -----  Type: Needs Medical Advice  Who Called:  Yasemin from [facility] Call Back Number: [phone]  Additional Information: Yasemin is calling the office to let Charli know that the pt was reached out to schedule an appt and declined to schedule at this time. Please call back to advise Thanks!

## 2024-01-23 ENCOUNTER — TELEPHONE (OUTPATIENT)
Dept: FAMILY MEDICINE | Facility: CLINIC | Age: 24
End: 2024-01-23
Payer: OTHER GOVERNMENT

## 2024-01-23 NOTE — TELEPHONE ENCOUNTER
----- Message from Fadia Soto DO sent at 1/4/2024  8:02 AM CST -----  Please let her know that her urine culture did not grow any bacteria.  She may complete antibiotic regimen as prescribed.  If symptoms worsen or fail to improve, please return to clinic for further evaluation.

## 2024-01-27 DIAGNOSIS — F90.9 ATTENTION DEFICIT HYPERACTIVITY DISORDER (ADHD), UNSPECIFIED ADHD TYPE: ICD-10-CM

## 2024-01-30 RX ORDER — LISDEXAMFETAMINE DIMESYLATE 50 MG/1
50 CAPSULE ORAL EVERY MORNING
Qty: 30 CAPSULE | Refills: 0 | Status: SHIPPED | OUTPATIENT
Start: 2024-01-30 | End: 2024-03-04

## 2024-01-30 NOTE — TELEPHONE ENCOUNTER
Last office visit: 1/2/2024  Medication Renewal Request  Received: 3 days ago  Luiza Carrero Staff  Phone Number: [phone]     Refills have been requested for the following medications:        lisdexamfetamine (VYVANSE) 50 MG capsule [Fadia Soto]    Preferred pharmacy: [facility], [place] - [address] [facility]  Delivery method: Pickup

## 2024-01-30 NOTE — TELEPHONE ENCOUNTER
PDMP data reviewed and appropriate - [facility]. Will need office visit for further refills.

## 2024-03-01 ENCOUNTER — PATIENT MESSAGE (OUTPATIENT)
Dept: FAMILY MEDICINE | Facility: CLINIC | Age: 24
End: 2024-03-01
Payer: OTHER GOVERNMENT

## 2024-03-04 ENCOUNTER — OFFICE VISIT (OUTPATIENT)
Dept: FAMILY MEDICINE | Facility: CLINIC | Age: 24
End: 2024-03-04
Payer: OTHER GOVERNMENT

## 2024-03-04 ENCOUNTER — PATIENT MESSAGE (OUTPATIENT)
Dept: FAMILY MEDICINE | Facility: CLINIC | Age: 24
End: 2024-03-04

## 2024-03-04 VITALS
SYSTOLIC BLOOD PRESSURE: 112 MMHG | WEIGHT: 153.19 LBS | HEART RATE: 91 BPM | HEIGHT: 64 IN | DIASTOLIC BLOOD PRESSURE: 70 MMHG | OXYGEN SATURATION: 100 % | BODY MASS INDEX: 26.15 KG/M2 | TEMPERATURE: 99 F

## 2024-03-04 DIAGNOSIS — F90.9 ATTENTION DEFICIT HYPERACTIVITY DISORDER (ADHD), UNSPECIFIED ADHD TYPE: Primary | ICD-10-CM

## 2024-03-04 DIAGNOSIS — M25.551 RIGHT HIP PAIN: ICD-10-CM

## 2024-03-04 DIAGNOSIS — Z87.891 HISTORY OF NICOTINE VAPING: ICD-10-CM

## 2024-03-04 LAB
AMPHET+METHAMPHET UR QL: NEGATIVE
BARBITURATES UR QL SCN>200 NG/ML: NEGATIVE
BENZODIAZ UR QL SCN>200 NG/ML: NEGATIVE
BZE UR QL SCN: NEGATIVE
CANNABINOIDS UR QL SCN: NEGATIVE
CREAT UR-MCNC: 63.1 MG/DL (ref 15–325)
METHADONE UR QL SCN>300 NG/ML: NEGATIVE
OPIATES UR QL SCN: NEGATIVE
PCP UR QL SCN>25 NG/ML: NEGATIVE
TOXICOLOGY INFORMATION: NORMAL

## 2024-03-04 PROCEDURE — 80307 DRUG TEST PRSMV CHEM ANLYZR: CPT | Performed by: STUDENT IN AN ORGANIZED HEALTH CARE EDUCATION/TRAINING PROGRAM

## 2024-03-04 PROCEDURE — 99214 OFFICE O/P EST MOD 30 MIN: CPT | Mod: S$GLB,,, | Performed by: STUDENT IN AN ORGANIZED HEALTH CARE EDUCATION/TRAINING PROGRAM

## 2024-03-04 RX ORDER — LISDEXAMFETAMINE DIMESYLATE 50 MG/1
50 CAPSULE ORAL EVERY MORNING
Qty: 30 CAPSULE | Refills: 0 | Status: SHIPPED | OUTPATIENT
Start: 2024-04-03 | End: 2024-04-25

## 2024-03-04 RX ORDER — LISDEXAMFETAMINE DIMESYLATE 50 MG/1
50 CAPSULE ORAL EVERY MORNING
Qty: 30 CAPSULE | Refills: 0 | Status: SHIPPED | OUTPATIENT
Start: 2024-05-03 | End: 2024-04-25 | Stop reason: SDUPTHER

## 2024-03-04 RX ORDER — LISDEXAMFETAMINE DIMESYLATE 50 MG/1
50 CAPSULE ORAL EVERY MORNING
Qty: 30 CAPSULE | Refills: 0 | Status: SHIPPED | OUTPATIENT
Start: 2024-03-04 | End: 2024-04-03

## 2024-03-04 NOTE — PROGRESS NOTES
[facility]  Primary Care Clinic - [place], [place]    Family Medicine Office Visit    Subjective     Patient ID: Luiza Asif is a 23 y.o. female who presents to clinic today to follow up.     Medical history, surgical history, medications, allergies, and social history were reviewed and updated.     Chief Complaint: Follow-up and Hip Pain    HPI    ADHD  She has a history of ADHD that was diagnosed by a psychiatrist in [place] where she lived previously.  We have received confirmation of testing.  She was signed controlled substance agreement.  Current medication regimen includes Vyvanse 50 mg daily.  Reported that she was actively studying.  At our last visit, we would discussed adjusting her dose of Vyvanse.  She reported that she feels that it is working well enough for now and wants to continue her current regimen.  Agreeable to complete urine drug screen today for further refills.    History of vaping  She reported that she recently quit vaping approximately 2 weeks ago.  Reported that this is the longest she is gone without vaping an few years.  She was hopeful that she will be able to remain off of it long term.  Reports that she feels that she does not need any further assistance.    Hip pain  Reports she has been sitting and studying frequently. It comes and goes. Has been using a massage gun and stretching with some improvement.  She has not taking Tylenol and Motrin over-the-counter.  She is reporting intermittent pain over her right lateral hip.  No recent falls, trauma, or accidents.    Vitals:    03/04/24 1039   BP: 112/70   Pulse: 91   Temp: 99 °F (37.2 °C)      Wt Readings from Last 3 Encounters:   03/04/24 1039 69.5 kg (153 lb 3.2 oz)   01/02/24 1015 73.6 kg (162 lb 4.8 oz)   10/26/23 1411 69.9 kg (154 lb)      Review of Systems    Review of Systems otherwise negative unless specified above.        Objective     Physical Exam  Vitals reviewed.   Constitutional:       General:  She is not in acute distress.     Appearance: Normal appearance.   HENT:      Head: Normocephalic and atraumatic.      Nose: Nose normal.      Mouth/Throat:      Mouth: Mucous membranes are moist.   Cardiovascular:      Rate and Rhythm: Regular rhythm. Tachycardia present.      Heart sounds: No murmur heard.  Pulmonary:      Effort: Pulmonary effort is normal. No respiratory distress.      Breath sounds: Normal breath sounds.   Musculoskeletal:         General: Normal range of motion.      Right hip: No tenderness or bony tenderness. Normal range of motion.      Left hip: No tenderness or bony tenderness. Normal range of motion.        Legs:    Skin:     General: Skin is warm and dry.   Neurological:      General: No focal deficit present.      Mental Status: She is alert and oriented to person, place, and time.   Psychiatric:         Mood and Affect: Mood normal.         Behavior: Behavior normal.            Assessment and Plan     Current Outpatient Medications   Medication Instructions    lisdexamfetamine (VYVANSE) 50 mg, Oral, Every morning    nicotine polacrilex 2 mg, Oral, As needed (PRN)        1. Attention deficit hyperactivity disorder (ADHD), unspecified ADHD type  -     Drug screen panel, in-house; Future; Expected date: 03/04/2024    2. History of nicotine vaping    3. Right hip pain        We will obtain urine drug screen today.  She would be appropriate, we will plan to continue Vyvanse 50 mg daily for the next 3 months.  Congratulated her on quitting vaping.  Encouraged her to continue her cessation and not go back.  For her right hip pain, I believe that she may have some improving trochanteric bursitis.  Recommended setting an alarm while she was studying to get up for 15-30 minutes every few hours to stay active.  Discussed that she can use Tylenol or Motrin as needed for pain.  Recommend that she continue her stretching and Phenergan use.  Otherwise, we will plan to have regular scheduled follow  up in 3 months.  She declined flu shot today.         Follow up in about 3 months (around 6/4/2024) for Follow up with Charles.    Questions were invited and answered. No other acute concerns at this time. Will plan to follow up as above or sooner if needed.     Fadia Soto,   03/04/2024 10:44 AM

## 2024-03-04 NOTE — PATIENT INSTRUCTIONS
Benjamin Jarrett,     If you are due for any health screening(s) below please notify me so we can arrange them to be ordered and scheduled. Most healthy patients at your age complete them, but you are free to accept or refuse.     If you can't do it, I'll definitely understand. If you can, I'd certainly appreciate it!    Tests to Keep You Healthy    Cervical Cancer Screening: Met on 10/5/2023  Tobacco Cessation: NO

## 2024-04-25 ENCOUNTER — PATIENT MESSAGE (OUTPATIENT)
Dept: FAMILY MEDICINE | Facility: CLINIC | Age: 24
End: 2024-04-25
Payer: OTHER GOVERNMENT

## 2024-04-25 DIAGNOSIS — F90.9 ATTENTION DEFICIT HYPERACTIVITY DISORDER (ADHD), UNSPECIFIED ADHD TYPE: ICD-10-CM

## 2024-04-25 RX ORDER — LISDEXAMFETAMINE DIMESYLATE 50 MG/1
50 CAPSULE ORAL EVERY MORNING
Qty: 30 CAPSULE | Refills: 0 | Status: SHIPPED | OUTPATIENT
Start: 2024-04-30 | End: 2024-05-28

## 2024-04-25 NOTE — PROGRESS NOTES
Patient has sent a message requesting early refill of Vyvanse 50 mg daily due to travel.  PDMP reviewed and is appropriate.  Urine drug screens appropriate.  I have canceled her initial prescription and sent it in for her to  on April 30th giving her time to obtain this before her flight on May 2nd.  She has office visit scheduled in June of 2024 for further refills.    Fadia Soto DO  04/25/2024 1:18 PM

## 2024-05-26 ENCOUNTER — PATIENT MESSAGE (OUTPATIENT)
Dept: FAMILY MEDICINE | Facility: CLINIC | Age: 24
End: 2024-05-26
Payer: OTHER GOVERNMENT

## 2024-05-26 DIAGNOSIS — F90.9 ATTENTION DEFICIT HYPERACTIVITY DISORDER (ADHD), UNSPECIFIED ADHD TYPE: ICD-10-CM

## 2024-05-26 NOTE — TELEPHONE ENCOUNTER
No care due was identified.  [facility] Embedded Care Due Messages. Reference number: [id number].   5/26/2024 11:57:15 AM CDT

## 2024-05-27 ENCOUNTER — OFFICE VISIT (OUTPATIENT)
Dept: FAMILY MEDICINE | Facility: CLINIC | Age: 24
End: 2024-05-27
Payer: OTHER GOVERNMENT

## 2024-05-27 VITALS
BODY MASS INDEX: 25.47 KG/M2 | WEIGHT: 149.19 LBS | OXYGEN SATURATION: 99 % | DIASTOLIC BLOOD PRESSURE: 74 MMHG | TEMPERATURE: 99 F | SYSTOLIC BLOOD PRESSURE: 110 MMHG | HEART RATE: 91 BPM | HEIGHT: 64 IN

## 2024-05-27 DIAGNOSIS — L98.9 FACIAL LESION: ICD-10-CM
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
## 2024-05-27 DIAGNOSIS — F90.9 ATTENTION DEFICIT HYPERACTIVITY DISORDER (ADHD), UNSPECIFIED ADHD TYPE: Primary | ICD-10-CM

## 2024-05-27 DIAGNOSIS — S89.92XA INJURY OF LEFT KNEE, INITIAL ENCOUNTER: ICD-10-CM

## 2024-05-27 LAB
AMPHET+METHAMPHET UR QL: ABNORMAL
BARBITURATES UR QL SCN>200 NG/ML: NEGATIVE
BENZODIAZ UR QL SCN>200 NG/ML: NEGATIVE
BZE UR QL SCN: NEGATIVE
CANNABINOIDS UR QL SCN: ABNORMAL
CREAT UR-MCNC: 208 MG/DL (ref 15–325)
METHADONE UR QL SCN>300 NG/ML: NEGATIVE
OPIATES UR QL SCN: NEGATIVE
PCP UR QL SCN>25 NG/ML: NEGATIVE
TOXICOLOGY INFORMATION: ABNORMAL

## 2024-05-27 PROCEDURE — G2211 COMPLEX E/M VISIT ADD ON: HCPCS | Mod: S$GLB,,, | Performed by: STUDENT IN AN ORGANIZED HEALTH CARE EDUCATION/TRAINING PROGRAM

## 2024-05-27 PROCEDURE — 80307 DRUG TEST PRSMV CHEM ANLYZR: CPT | Performed by: STUDENT IN AN ORGANIZED HEALTH CARE EDUCATION/TRAINING PROGRAM

## 2024-05-27 PROCEDURE — 99214 OFFICE O/P EST MOD 30 MIN: CPT | Mod: S$GLB,,, | Performed by: STUDENT IN AN ORGANIZED HEALTH CARE EDUCATION/TRAINING PROGRAM

## 2024-05-27 RX ORDER — LISDEXAMFETAMINE DIMESYLATE 50 MG/1
50 CAPSULE ORAL EVERY MORNING
Qty: 30 CAPSULE | Refills: 0 | OUTPATIENT
Start: 2024-05-27 | End: 2024-06-26

## 2024-05-27 NOTE — PATIENT INSTRUCTIONS
Benjamin Jarrett,     If you are due for any health screening(s) below please notify me so we can arrange them to be ordered and scheduled. Most healthy patients at your age complete them, but you are free to accept or refuse.     If you can't do it, I'll definitely understand. If you can, I'd certainly appreciate it!    All of your core healthy metrics are met.

## 2024-05-27 NOTE — PROGRESS NOTES
[facility]  Primary Care Clinic - [place], [place]    Family Medicine Office Visit    Subjective     Patient ID: Luiza Asif is a 23 y.o. female who presents to clinic today to follow up.     Medical history, surgical history, medications, allergies, and social history were reviewed and updated.     Chief Complaint: ADHD (Refills on Vyvanse ), Knee Pain (Left knee x  1 yr. Sprain her Meniscus back in March, Now. C/o pain when cleaning or  bending.), and Referral (Dermatology for acne papules. One acne  scar  noted x 6 months and she is concerned.)    HPI    ADHD  History of ADHD with current medication regimen of Vyvanse 50 mg daily.  Reports that it was working well for her.  No issues with appetite or sleep reported.  Requesting refills today.  We will complete urine drug screen.    Left knee injury  Went to visit home and went snow boarding about 3 months ago. Fell on left knee. Has a history of sprained meniscus. Has been getting better. Was swollen after the injury and tender to touch. Has done PT for her knee before and did exercises she remembered at home.  Reported that she was not needing over-the-counter pain medications for this.  Feels that it was overall improve, but just wanted to let me know.  Discussed that should she feel that her knee is worsening or failing to improve fully, I am more than happy to have it further evaluated we will place referral to PT.    Dermatology  Facial lesion on left cheek near nose for 6 months. Has tried acne treatments without improvement.     Vitals:    05/27/24 1135   BP: 110/74   Pulse: 91   Temp: 98.9 °F (37.2 °C)      Wt Readings from Last 3 Encounters:   05/27/24 1135 67.7 kg (149 lb 3.2 oz)   03/04/24 1039 69.5 kg (153 lb 3.2 oz)   01/02/24 1015 73.6 kg (162 lb 4.8 oz)      Review of Systems    Review of Systems otherwise negative unless specified above.        Objective     Physical Exam  Vitals reviewed.   Constitutional:       General: She  is not in acute distress.     Appearance: Normal appearance.   HENT:      Head: Normocephalic and atraumatic.      Nose: Nose normal.      Mouth/Throat:      Mouth: Mucous membranes are moist.   Cardiovascular:      Rate and Rhythm: Regular rhythm. Tachycardia present.      Heart sounds: No murmur heard.  Pulmonary:      Effort: Pulmonary effort is normal. No respiratory distress.      Breath sounds: Normal breath sounds.   Musculoskeletal:         General: Normal range of motion.   Skin:     General: Skin is warm and dry.   Neurological:      General: No focal deficit present.      Mental Status: She is alert and oriented to person, place, and time.   Psychiatric:         Mood and Affect: Mood normal.         Behavior: Behavior normal.            Assessment and Plan     Current Outpatient Medications   Medication Instructions    lisdexamfetamine (VYVANSE) 50 mg, Oral, Every morning    nicotine polacrilex 2 mg, Oral, As needed (PRN)        1. Attention deficit hyperactivity disorder (ADHD), unspecified ADHD type  -     Drug screen panel, in-house; Future    2. Facial lesion  -     Ambulatory referral/consult to Dermatology; Future; Expected date: 06/03/2024    3. Fall, initial encounter    4. Injury of left knee, initial encounter        We will complete urine drug screen.  If appropriate, we will plan to continue Vyvanse for three-month supply.  We will place referral to Dermatology per her request.  For her fall, recommend that she continue physical therapy exercises from her prior experience.  Discussed that if she she feels it is worsening, I am happy to place a referral for physical therapy if needed.    Of note, she does report that she and her  are planning to move from the area, but she was unsure where he was going.  Discussed that she would be able to obtain records as soon as she let us know where we need to have him sent.    Visit today included increased complexity associated with the care of the  episodic problem ADHD addressed and managing the longitudinal care of the patient due to the serious and/or complex managed problem(s) ADHD.         Follow up in about 3 months (around 8/27/2024) for Follow up with Charles.    Questions were invited and answered. No other acute concerns at this time. Will plan to follow up as above or sooner if needed.     Fadia Soto, DO  05/27/2024 11:51 AM       This dictation has been generated using Modal Fluency Dictation. Some phonetic errors may occur. Please contact author for clarification if needed.

## 2024-05-28 DIAGNOSIS — F90.9 ATTENTION DEFICIT HYPERACTIVITY DISORDER (ADHD), UNSPECIFIED ADHD TYPE: Primary | ICD-10-CM

## 2024-05-28 RX ORDER — LISDEXAMFETAMINE DIMESYLATE 50 MG/1
50 CAPSULE ORAL EVERY MORNING
Qty: 30 CAPSULE | Refills: 0 | Status: SHIPPED | OUTPATIENT
Start: 2024-06-27 | End: 2024-07-27

## 2024-05-28 RX ORDER — LISDEXAMFETAMINE DIMESYLATE 50 MG/1
50 CAPSULE ORAL EVERY MORNING
Qty: 30 CAPSULE | Refills: 0 | Status: SHIPPED | OUTPATIENT
Start: 2024-05-28 | End: 2024-06-27

## 2024-05-28 RX ORDER — LISDEXAMFETAMINE DIMESYLATE 50 MG/1
50 CAPSULE ORAL EVERY MORNING
Qty: 30 CAPSULE | Refills: 0 | Status: SHIPPED | OUTPATIENT
Start: 2024-07-27 | End: 2024-08-26

## 2024-07-17 ENCOUNTER — PATIENT MESSAGE (OUTPATIENT)
Dept: FAMILY MEDICINE | Facility: CLINIC | Age: 24
End: 2024-07-17
Payer: OTHER GOVERNMENT

## 2024-07-29 DIAGNOSIS — F90.9 ATTENTION DEFICIT HYPERACTIVITY DISORDER (ADHD), UNSPECIFIED ADHD TYPE: ICD-10-CM

## 2024-07-29 RX ORDER — LISDEXAMFETAMINE DIMESYLATE 50 MG/1
50 CAPSULE ORAL EVERY MORNING
Qty: 30 CAPSULE | Refills: 0 | Status: SHIPPED | OUTPATIENT
Start: 2024-07-29 | End: 2024-08-28

## 2024-07-29 NOTE — TELEPHONE ENCOUNTER
No care due was identified.  [facility] Embedded Care Due Messages. Reference number: [id number].   7/29/2024 1:47:22 PM CDT

## 2024-07-29 NOTE — TELEPHONE ENCOUNTER
Ciaran Garcia,  Please review this Rx refill request for this patient of Dr. Soto in her absence.   Last office visit: 5/27/2024  Thank you.  Signed:  Mariah Pelayo LPN  ----- Message from Libia Abebe sent at 7/29/2024  8:59 AM CDT -----  Contact: Patient  Type:  Needs Medical Advice    Who Called: Patient    Pharmacy name and phone #:   [facility] - [place], [place] - [address]  [address] [address]  Phone: [phone] Fax: [phone]          Would the patient rather a call back or a response via [facility]? Call    Best Call Back Number: [phone] (home)     Additional Information: lisdexamfetamine (VYVANSE) 50 MG capsule    Patient's pharmacy has stated that the patient needs a new script called in for the medication above. Please call to advise     Patient states that she is now out.

## 2024-07-29 NOTE — TELEPHONE ENCOUNTER
Duplicate message  ----- Message from Chelsea Sinclair sent at 7/29/2024  7:22 AM CDT -----  Contact: Self  Type:  RX Refill Request    Who Called:  Self  Refill or New Rx:  refill - she needs it TODAY before she leaves to go out of town tomorrow   RX Name and Strength:  lisdexamfetamine (VYVANSE) 50 MG capsule    How is the patient currently taking it? (ex. 1XDay):  Take 1 capsule (50 mg total) by mouth every morning. - Oral   Is this a 30 day or 90 day RX:  30 capsule 0  Preferred Pharmacy with phone number:    [facility] - [place], [place] - [address]  [address] [place] [address]  Phone: [phone] Fax: [phone]  Local or Mail Order:  Local  Ordering Provider:    Jesús Call Back Number:  [phone]  Additional Information:  Please call the patient back at the phone number listed above to advise. Thank you!

## 2024-08-21 ENCOUNTER — TELEPHONE (OUTPATIENT)
Dept: FAMILY MEDICINE | Facility: CLINIC | Age: 24
End: 2024-08-21
Payer: OTHER GOVERNMENT

## 2024-08-21 ENCOUNTER — PATIENT MESSAGE (OUTPATIENT)
Dept: FAMILY MEDICINE | Facility: CLINIC | Age: 24
End: 2024-08-21
Payer: OTHER GOVERNMENT

## 2024-08-21 DIAGNOSIS — F90.9 ATTENTION DEFICIT HYPERACTIVITY DISORDER (ADHD), UNSPECIFIED ADHD TYPE: Primary | ICD-10-CM

## 2024-08-21 DIAGNOSIS — F90.9 ATTENTION DEFICIT HYPERACTIVITY DISORDER (ADHD), UNSPECIFIED ADHD TYPE: ICD-10-CM

## 2024-08-21 RX ORDER — LISDEXAMFETAMINE DIMESYLATE 50 MG/1
50 CAPSULE ORAL EVERY MORNING
Qty: 30 CAPSULE | Refills: 0 | OUTPATIENT
Start: 2024-08-21 | End: 2024-09-20

## 2024-08-21 NOTE — TELEPHONE ENCOUNTER
----- Message from Estefany Wiley sent at 8/21/2024  2:07 PM CDT -----  Contact: pt  Type: Needs Medical Advice         Who Called: pt  Best Call Back Number:[phone]  Additional Information: Requesting a call back regarding pt is asking of office can send a referral for continue use of the rx lisdexamfetamine (VYVANSE) 50 MG capsule. Pt is [place] to another state and the [employer] is requesting it in order to get her rx. Pt is asking to send it to her so she can ensure it goes to the correct department.     Please Advise- Thank you

## 2024-08-21 NOTE — TELEPHONE ENCOUNTER
----- Message from Libia Abebe sent at 8/21/2024  4:01 PM CDT -----  Contact: Patient  Type:  Patient Returning Call    Who Called:Patient    Who Left Message for Patient:Mariah    Does the patient know what this is regarding?:Yes    Would the patient rather a call back or a response via [facility]? Call    Best Call Back Number:[phone] (home)     Additional Information: Please call to advise

## 2024-08-21 NOTE — TELEPHONE ENCOUNTER
Last office visit: 5/27/2024  ----- Message from Ana Alicea sent at 8/21/2024  2:41 PM CDT -----  Contact: PT  Type:  RX Refill Request    Who Called:  PT  Refill or New Rx: refill  RX Name and Strength:  lisdexamfetamine (VYVANSE) 50 MG capsule  How is the patient currently taking it?  Take 1 capsule (50 mg total) by mouth every morning.  Is this a 30 day or 90 day RX: 30  Preferred Pharmacy with phone number:   [facility] - [address] [address]  Phone: [phone] Fax: [phone]  NPI# [id number]  NCPDP ID# [id number]     Best Call Back Number:  [phone]  Additional Information: PT  said she also told a referral is needed for pharm to fill RX due it being a controlled substance along with script being sent electronically